# Patient Record
Sex: MALE | Race: OTHER | URBAN - METROPOLITAN AREA
[De-identification: names, ages, dates, MRNs, and addresses within clinical notes are randomized per-mention and may not be internally consistent; named-entity substitution may affect disease eponyms.]

---

## 2020-02-05 ENCOUNTER — EMERGENCY (EMERGENCY)
Facility: HOSPITAL | Age: 25
LOS: 1 days | Discharge: HOME | End: 2020-02-05
Admitting: EMERGENCY MEDICINE
Payer: COMMERCIAL

## 2020-02-05 VITALS
HEART RATE: 73 BPM | DIASTOLIC BLOOD PRESSURE: 65 MMHG | RESPIRATION RATE: 18 BRPM | TEMPERATURE: 98 F | WEIGHT: 199.96 LBS | HEIGHT: 72 IN | OXYGEN SATURATION: 97 % | SYSTOLIC BLOOD PRESSURE: 95 MMHG

## 2020-02-05 VITALS
OXYGEN SATURATION: 100 % | RESPIRATION RATE: 17 BRPM | HEART RATE: 73 BPM | SYSTOLIC BLOOD PRESSURE: 116 MMHG | TEMPERATURE: 99 F | DIASTOLIC BLOOD PRESSURE: 72 MMHG

## 2020-02-05 PROCEDURE — 99284 EMERGENCY DEPT VISIT MOD MDM: CPT

## 2020-02-05 RX ORDER — DIPHENHYDRAMINE HCL 50 MG
1 CAPSULE ORAL
Qty: 15 | Refills: 0
Start: 2020-02-05 | End: 2020-02-09

## 2020-02-05 RX ORDER — EPINEPHRINE 0.3 MG/.3ML
0.3 INJECTION INTRAMUSCULAR; SUBCUTANEOUS ONCE
Refills: 0 | Status: DISCONTINUED | OUTPATIENT
Start: 2020-02-05 | End: 2020-02-11

## 2020-02-05 RX ORDER — SODIUM CHLORIDE 9 MG/ML
1000 INJECTION INTRAMUSCULAR; INTRAVENOUS; SUBCUTANEOUS ONCE
Refills: 0 | Status: COMPLETED | OUTPATIENT
Start: 2020-02-05 | End: 2020-02-05

## 2020-02-05 RX ORDER — FAMOTIDINE 10 MG/ML
1 INJECTION INTRAVENOUS
Qty: 14 | Refills: 0
Start: 2020-02-05 | End: 2020-02-11

## 2020-02-05 RX ORDER — FAMOTIDINE 10 MG/ML
20 INJECTION INTRAVENOUS ONCE
Refills: 0 | Status: COMPLETED | OUTPATIENT
Start: 2020-02-05 | End: 2020-02-05

## 2020-02-05 RX ADMIN — FAMOTIDINE 20 MILLIGRAM(S): 10 INJECTION INTRAVENOUS at 16:40

## 2020-02-05 RX ADMIN — SODIUM CHLORIDE 1000 MILLILITER(S): 9 INJECTION INTRAMUSCULAR; INTRAVENOUS; SUBCUTANEOUS at 16:43

## 2020-02-05 NOTE — ED ADULT NURSE NOTE - CHIEF COMPLAINT QUOTE
Patient to ED BIBEMS for allergic reaction to unknown source.  Patient with hives at [facility] clinic and complained of throat feeling tight and chest pain.  Given 0.3 12 Dex, 50 benadryl IV.  Symptoms have mostly resolved.

## 2020-02-05 NOTE — ED PROVIDER NOTE - PATIENT PORTAL LINK FT
You can access the FollowMyHealth Patient Portal offered by [facility] by registering at the following website: [email or web address]. By joining [facility]’s FollowMyHealth portal, you will also be able to view your health information using other applications (apps) compatible with our system.

## 2020-02-05 NOTE — ED ADULT NURSE NOTE - NS ED NOTE ABUSE RESPONSE YN
Yes I personally saw the patient with the ACP, and completed the key components of the history and physical exam. I then discussed the management plan with the ACP.

## 2020-02-05 NOTE — ED PROVIDER NOTE - OBJECTIVE STATEMENT
25 y/o M presents to ED from [facility] clinic after being treated for an allergic reaction.  Pt reported hives and throat tightness after eating Thai bread with butter, grapes and cheese.  He states he has eaten all of these foods before.  He was given Benadryl, Decadron and epipen pta.  Pt states his symptoms resolved pta.

## 2020-02-05 NOTE — ED ADULT TRIAGE NOTE - CHIEF COMPLAINT QUOTE
Patient to ED BIBEMS for allergic reaction to unknown source.  Patient with hives at [facility] clinic and complained of throat feeling tight and chest pain.  Given 0.3 12 Dex, 50 benadryl IV.  Symptoms have mostly resolved.

## 2020-02-05 NOTE — ED PROVIDER NOTE - CLINICAL SUMMARY MEDICAL DECISION MAKING FREE TEXT BOX
23 y/o M presents to ED c/o allergic reaction.  Pt well appearing, VSS, NAD.  Pt given IV famotidine and IVFs. 23 y/o M presents to ED c/o allergic reaction.  Pt well appearing, VSS, NAD.  Pt given IV famotidine and IVFs.  Pt observed in ED without reoccurrence.  D/c'd with prednisone burst, benadryl prn, famotidine and Epipen.

## 2020-02-11 DIAGNOSIS — R07.0 PAIN IN THROAT: ICD-10-CM

## 2020-02-11 DIAGNOSIS — L50.0 ALLERGIC URTICARIA: ICD-10-CM
